# Patient Record
Sex: FEMALE | Race: BLACK OR AFRICAN AMERICAN | Employment: UNEMPLOYED | URBAN - METROPOLITAN AREA
[De-identification: names, ages, dates, MRNs, and addresses within clinical notes are randomized per-mention and may not be internally consistent; named-entity substitution may affect disease eponyms.]

---

## 2021-12-29 ENCOUNTER — APPOINTMENT (OUTPATIENT)
Dept: GENERAL RADIOLOGY | Facility: CLINIC | Age: 66
End: 2021-12-29
Payer: COMMERCIAL

## 2021-12-29 ENCOUNTER — HOSPITAL ENCOUNTER (EMERGENCY)
Facility: CLINIC | Age: 66
Discharge: HOME | End: 2021-12-29
Attending: EMERGENCY MEDICINE
Payer: COMMERCIAL

## 2021-12-29 VITALS
HEIGHT: 64 IN | RESPIRATION RATE: 20 BRPM | BODY MASS INDEX: 37.56 KG/M2 | HEART RATE: 72 BPM | OXYGEN SATURATION: 99 % | SYSTOLIC BLOOD PRESSURE: 140 MMHG | WEIGHT: 220 LBS | DIASTOLIC BLOOD PRESSURE: 81 MMHG | TEMPERATURE: 98.9 F

## 2021-12-29 DIAGNOSIS — B34.9 VIRAL SYNDROME: Primary | ICD-10-CM

## 2021-12-29 LAB
ABSOLUTE EOS #: 0.2 K/UL (ref 0–0.4)
ABSOLUTE IMMATURE GRANULOCYTE: NORMAL K/UL (ref 0–0.3)
ABSOLUTE LYMPH #: 2.6 K/UL (ref 1–4.8)
ABSOLUTE MONO #: 0.5 K/UL (ref 0.1–1.2)
ANION GAP SERPL CALCULATED.3IONS-SCNC: 13 MMOL/L (ref 9–17)
BASOPHILS # BLD: 1 % (ref 0–2)
BASOPHILS ABSOLUTE: 0 K/UL (ref 0–0.2)
BNP INTERPRETATION: NORMAL
BUN BLDV-MCNC: 10 MG/DL (ref 8–23)
BUN/CREAT BLD: NORMAL (ref 9–20)
CALCIUM SERPL-MCNC: 10.4 MG/DL (ref 8.6–10.4)
CHLORIDE BLD-SCNC: 104 MMOL/L (ref 98–107)
CO2: 24 MMOL/L (ref 20–31)
CREAT SERPL-MCNC: 0.7 MG/DL (ref 0.5–0.9)
D-DIMER QUANTITATIVE: 0.3 MG/L FEU
DIFFERENTIAL TYPE: NORMAL
EOSINOPHILS RELATIVE PERCENT: 4 % (ref 1–4)
GFR AFRICAN AMERICAN: >60 ML/MIN
GFR NON-AFRICAN AMERICAN: >60 ML/MIN
GFR SERPL CREATININE-BSD FRML MDRD: NORMAL ML/MIN/{1.73_M2}
GFR SERPL CREATININE-BSD FRML MDRD: NORMAL ML/MIN/{1.73_M2}
GLUCOSE BLD-MCNC: 78 MG/DL (ref 70–99)
HCT VFR BLD CALC: 44.4 % (ref 36–46)
HEMOGLOBIN: 14.6 G/DL (ref 12–16)
IMMATURE GRANULOCYTES: NORMAL %
LYMPHOCYTES # BLD: 42 % (ref 24–44)
MCH RBC QN AUTO: 29.2 PG (ref 26–34)
MCHC RBC AUTO-ENTMCNC: 32.8 G/DL (ref 31–37)
MCV RBC AUTO: 89.1 FL (ref 80–100)
MONOCYTES # BLD: 8 % (ref 2–11)
NRBC AUTOMATED: NORMAL PER 100 WBC
PDW BLD-RTO: 13.7 % (ref 12.5–15.4)
PLATELET # BLD: 272 K/UL (ref 140–450)
PLATELET ESTIMATE: NORMAL
PMV BLD AUTO: 8.4 FL (ref 6–12)
POTASSIUM SERPL-SCNC: 4.1 MMOL/L (ref 3.7–5.3)
PRO-BNP: 30 PG/ML
RBC # BLD: 4.98 M/UL (ref 4–5.2)
RBC # BLD: NORMAL 10*6/UL
SEG NEUTROPHILS: 45 % (ref 36–66)
SEGMENTED NEUTROPHILS ABSOLUTE COUNT: 2.8 K/UL (ref 1.8–7.7)
SODIUM BLD-SCNC: 141 MMOL/L (ref 135–144)
TROPONIN INTERP: NORMAL
TROPONIN T: NORMAL NG/ML
TROPONIN, HIGH SENSITIVITY: 9 NG/L (ref 0–14)
WBC # BLD: 6.2 K/UL (ref 3.5–11)
WBC # BLD: NORMAL 10*3/UL

## 2021-12-29 PROCEDURE — 80048 BASIC METABOLIC PNL TOTAL CA: CPT

## 2021-12-29 PROCEDURE — 99284 EMERGENCY DEPT VISIT MOD MDM: CPT

## 2021-12-29 PROCEDURE — 93005 ELECTROCARDIOGRAM TRACING: CPT

## 2021-12-29 PROCEDURE — 83880 ASSAY OF NATRIURETIC PEPTIDE: CPT

## 2021-12-29 PROCEDURE — 85025 COMPLETE CBC W/AUTO DIFF WBC: CPT

## 2021-12-29 PROCEDURE — 36415 COLL VENOUS BLD VENIPUNCTURE: CPT

## 2021-12-29 PROCEDURE — 85379 FIBRIN DEGRADATION QUANT: CPT

## 2021-12-29 PROCEDURE — 84484 ASSAY OF TROPONIN QUANT: CPT

## 2021-12-29 PROCEDURE — 71046 X-RAY EXAM CHEST 2 VIEWS: CPT

## 2021-12-29 RX ORDER — BISOPROLOL FUMARATE AND HYDROCHLOROTHIAZIDE 5; 6.25 MG/1; MG/1
5-6.25 TABLET ORAL DAILY
COMMUNITY

## 2021-12-29 RX ORDER — POTASSIUM CHLORIDE 750 MG/1
10 TABLET, EXTENDED RELEASE ORAL EVERY OTHER DAY
COMMUNITY

## 2021-12-29 NOTE — ED PROVIDER NOTES
[facility]  [address] [facility]  Phone: Lpjp Ozrbdih      Pt Name: Ruiz Samano  MRN: [id number]  [place] 1955  Date of evaluation: 12/29/21      CHIEF COMPLAINT:  Chief Complaint   Patient presents with    Shortness of Breath    Generalized Body Aches    Sinusitis       HISTORY OF PRESENT ILLNESS    Ruiz Samano is a 73 y.o. female who presents with complaints of shortness of breath, intermittent cough, generalized body aches, nausea, right sided back pain, and nasal congestion. She states that her symptoms started at the end of October and she has been seen multiple times by her family physician. She states that she has been on a course of Augmentin although did not finish because it made her \"sick\" at which time she states that she called the office and they switched her to a Z-West. She states that she finished the Z-West with little improvement and was later again prescribed a second Z-West that she finished approximately 2-1/2 weeks ago. She also reports that she is taking albuterol nebulizers as needed. She states that she continues to experience the above symptoms. She reports right-sided lumbar back pain that is increased with movement. She states that she has been fully vaccinated for COVID and has had 5 tests in the last 2 months that were all negative. Patient reports that she has a history of pulmonary hypertension although nothing documented in chart. She states that she sees Dr. Maris Dupree through the [facility] clinic and we are unable to see these records. She denies any reports of current fevers, chills, wheezing, chest pain, dyspnea. Nursing Notes were reviewed. REVIEW OF SYSTEMS       Constitutional: Denies fever or chills. Eyes: No visual changes. Neck: No neck pain. Respiratory: Reports intermittent cough with feelings of shortness of breath. Denies dyspnea or wheezing.   Cardiac: Denies chest pain  GI:  Denies abdominal pain/vomiting/diarrhea. Reports nausea. : Denies dysuria. Musculoskeletal: Complaining of right-sided lumbar back pain. Denies focal weakness. Neurologic: Denies headache or focal weakness. Skin:  Denies any rash. Negative in 10 essential Systems except as mentioned above and in the HPI. PAST MEDICAL HISTORY   PMH:  has no past medical history on file. Surgical History:  has no past surgical history on file. Social History:  reports that she has never smoked. She has never used smokeless tobacco. She reports that she does not drink alcohol. Family History: Noncontributory   Psychiatric History: Noncontributory     Allergies:is allergic to ciprofloxacin, fluconazole, lactose, sulfamethoxazole-trimethoprim, atenolol, bee venom, celecoxib, sulfa antibiotics, and adhesive tape. PHYSICAL EXAM     INITIAL VITALS: BP (!) 140/81   Pulse 72   Temp 98.9 °F (37.2 °C) (Oral)   Resp 20   Ht 5' 4\" (1.626 m)   Wt 99.8 kg (220 lb)   SpO2 99%   BMI 37.76 kg/m²   Constitutional:  Well developed   Eyes:  Pupils equal/round  HENT:  Atraumatic, External ears normal, Nose normal, Post pharynx normal, no tonsillar edema/erythema. Oropharynx moist. Neck- supple, no lymphadenopathy. Respiratory:   Clear to auscultation bilaterally with good air exchange. No W/R/R  Cardiovascular:  RRR with normal S1 and S2  Gastrointestinal/Abdomen:  Soft, NT.  BS present. Musculoskeletal:  Normal to inspection  Back:  No CVA tenderness. Mild right-sided paraspinal lumbar tenderness. Normal to inspection. No step-off or deformities. Integument:  No rash.     Neurologic:  Alert, age appropriate interaction/mentation, no focal deficits noted       DIAGNOSTIC RESULTS     EKG: All EKG's are interpreted by the Emergency Department Physician who either signs or Co-signs this chart in the absence of a cardiologist.  Per attending note    RADIOLOGY:   Reviewed the radiologist:  XR CHEST (2 VW)   Final Result No acute process. XR CHEST (2 VW)    Result Date: 12/29/2021  EXAMINATION: TWO XRAY VIEWS OF THE CHEST 12/29/2021 4:37 pm COMPARISON: None. HISTORY: ORDERING SYSTEM PROVIDED HISTORY: Shortness of breath TECHNOLOGIST PROVIDED HISTORY: Shortness of breath Reason for Exam: Pt c/o cough and sob x 2 months FINDINGS: The lungs are without acute focal process. There is no effusion or pneumothorax. The cardiomediastinal silhouette is without acute process. The osseous structures are without acute process. No acute process. LABS:  Labs Reviewed   CBC WITH AUTO DIFFERENTIAL   BASIC METABOLIC PANEL   TROPONIN   D-DIMER, QUANTITATIVE   BRAIN NATRIURETIC PEPTIDE       EMERGENCY DEPARTMENT COURSE/MDM/DDX:   Plan is to obtain EKG, labs to include CBC, BMP, troponin, D-dimer and BNP. Will await D-dimer results to determine CT versus chest x-ray. D-dimer within normal limits chest x-ray 2 view ordered. Chest x-ray without acute process. Blood work within normal limits. Patient updated on normal results. We discussed follow-up with primary care physician for possible referral to specialist for further work-up related to continued complaints of symptoms. Patient verbalized understanding. The patient and/or family and I have discussed the diagnosis and risks, and we agree with discharging home to follow-up with their pertinent providers. The patient appears stable for discharge and has been instructed to return immediately for new concerning symptoms or if the symptoms worsen in any way. The patient understands that at this time there is no evidence for a more malignant underlying process, but the patient also understands that early in the process of an illness or injury, an emergency department workup can be falsely reassuring.  Routine discharge counseling was given, and the patient understands that worsening, changing or persistent symptoms should prompt an immediate call or follow up with their primary physician or return to the emergency department. I have reviewed the disposition diagnosis with the patient and or their family/guardian. I have answered their questions and given discharge instructions. They voiced understanding of these instructions and did not have any further questions or complaints. This patient was seen by the attending physician and they agreed with the assessment and plan. No orders of the defined types were placed in this encounter. CONSULTS:  None      FINAL IMPRESSION      1.  Viral syndrome          DISPOSITION/PLAN:  DISPOSITION          PATIENT REFERRED TO:  Vasiliy Martel [address] [address]  [phone]    Call in 2 days      [facility]  [address]  [phone]    As needed      [address]:  New Prescriptions    No medications on file       (Please note that portions of this note were completed with a voice recognition program.  Efforts were made to edit the dictations but occasionally words are mis-transcribed.)    YANELIS Mosher CNP, APRN - CNP  12/29/21 0249

## 2021-12-29 NOTE — ED NOTES
Patient to ED via self and  ambulatory to room 3  Patient here for complaint of cough, generalized body aches, fevers, chills, and back pain for the last two months  Patient has been seen and evaluated for the same complaint and has taken multiple antibiotics  Denies CP, SOB, or N/V    Vitals obtained and call light provided  Patient resting comfortably on stretcher in no apparent distress  Respirations even and non-labored  No other needs at this time     Tish Vann RN  12/29/21 [id number]

## 2021-12-29 NOTE — ED PROVIDER NOTES
Attending Supervisory Note/Shared Visit   I have personally performed a face to face diagnostic evaluation on this patient. I have reviewed the mid-levels findings and agree.         (Please note that portions of this note were completed with a voice recognition program.  Efforts were made to edit the dictations but occasionally words are mis-transcribed.)    Caroline Lutz MD  Attending Emergency Physician        Caroline Lutz MD  12/29/21 [id number]

## 2021-12-30 LAB
EKG ATRIAL RATE: 67 BPM
EKG P AXIS: 36 DEGREES
EKG P-R INTERVAL: 120 MS
EKG Q-T INTERVAL: 388 MS
EKG QRS DURATION: 74 MS
EKG QTC CALCULATION (BAZETT): 409 MS
EKG R AXIS: 7 DEGREES
EKG T AXIS: -16 DEGREES
EKG VENTRICULAR RATE: 67 BPM